# Patient Record
Sex: MALE | URBAN - NONMETROPOLITAN AREA
[De-identification: names, ages, dates, MRNs, and addresses within clinical notes are randomized per-mention and may not be internally consistent; named-entity substitution may affect disease eponyms.]

---

## 2018-12-26 ENCOUNTER — OFFICE VISIT (OUTPATIENT)
Dept: URBAN - NONMETROPOLITAN AREA CLINIC 10 | Facility: CLINIC | Age: 58
End: 2018-12-26
Payer: COMMERCIAL

## 2018-12-26 DIAGNOSIS — H25.13 AGE-RELATED NUCLEAR CATARACT, BILATERAL: ICD-10-CM

## 2018-12-26 PROCEDURE — 92004 COMPRE OPH EXAM NEW PT 1/>: CPT | Performed by: OPTOMETRIST

## 2018-12-26 RX ORDER — NEOMYCIN SULFATE, POLYMYXIN B SULFATE AND DEXAMETHASONE 3.5; 10000; 1 MG/ML; [USP'U]/ML; MG/ML
SUSPENSION OPHTHALMIC
Qty: 1 | Refills: 0 | Status: ACTIVE
Start: 2018-12-26

## 2018-12-26 ASSESSMENT — INTRAOCULAR PRESSURE
OS: 14
OD: 14

## 2018-12-26 NOTE — IMPRESSION/PLAN
Impression: Open angle with borderline findings, low risk, bilateral: H40.013. Plan: Diagnosis discussed in detail. No treatment required at this time. Continue to observe pt's optic nerve and IOP
RTC in 5 days for VF 24-2.

## 2018-12-26 NOTE — IMPRESSION/PLAN
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 Plan: Diagnosis discussed.  Start maxitrol QID OS x 1 week then d/c

## 2018-12-26 NOTE — IMPRESSION/PLAN
Impression: Age-related nuclear cataract, bilateral: H25.13. Plan: Discussed diagnosis in detail with patient. No treatment is required at this time. Will continue to observe condition and or symptoms. Call if [address] worsens.

## 2018-12-31 ENCOUNTER — OFFICE VISIT (OUTPATIENT)
Dept: URBAN - NONMETROPOLITAN AREA CLINIC 10 | Facility: CLINIC | Age: 58
End: 2018-12-31
Payer: COMMERCIAL

## 2018-12-31 DIAGNOSIS — H40.013 OPEN ANGLE WITH BORDERLINE FINDINGS, LOW RISK, BILATERAL: Primary | ICD-10-CM

## 2018-12-31 DIAGNOSIS — H11.89 OTHER SPECIFIED DISORDERS OF CONJUNCTIVA: ICD-10-CM

## 2018-12-31 PROCEDURE — 92012 INTRM OPH EXAM EST PATIENT: CPT | Performed by: OPTOMETRIST

## 2018-12-31 PROCEDURE — 92083 EXTENDED VISUAL FIELD XM: CPT | Performed by: OPTOMETRIST

## 2018-12-31 ASSESSMENT — INTRAOCULAR PRESSURE
OD: 16
OS: 18

## 2018-12-31 NOTE — IMPRESSION/PLAN
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 Plan: Dx discussed with pt. Discussed option to have consult with oculoplastics as there was no improvement with Maxitrol tx. Continue to monitor.

## 2018-12-31 NOTE — IMPRESSION/PLAN
Impression: Open angle with borderline findings, low risk, bilateral: H40.013. Plan: Diagnosis discussed in detail. No treatment required at this time.   Continue to observe pt's optic nerve and IOP

## 2025-02-18 ENCOUNTER — OFFICE VISIT (OUTPATIENT)
Dept: URBAN - METROPOLITAN AREA CLINIC 59 | Facility: CLINIC | Age: 65
End: 2025-02-18
Payer: COMMERCIAL

## 2025-02-18 DIAGNOSIS — H11.153 PINGUECULA, BILATERAL: ICD-10-CM

## 2025-02-18 DIAGNOSIS — H25.13 AGE-RELATED NUCLEAR CATARACT, BILATERAL: ICD-10-CM

## 2025-02-18 DIAGNOSIS — H40.013 OPEN ANGLE WITH BORDERLINE FINDINGS, LOW RISK, BILATERAL: Primary | ICD-10-CM

## 2025-02-18 DIAGNOSIS — H11.9 UNSPECIFIED DISORDER OF CONJUNCTIVA: ICD-10-CM

## 2025-02-18 PROCEDURE — 92004 COMPRE OPH EXAM NEW PT 1/>: CPT | Performed by: OPTOMETRIST

## 2025-02-18 PROCEDURE — 76514 ECHO EXAM OF EYE THICKNESS: CPT | Performed by: OPTOMETRIST

## 2025-02-18 PROCEDURE — 92250 FUNDUS PHOTOGRAPHY W/I&R: CPT | Performed by: OPTOMETRIST

## 2025-02-18 ASSESSMENT — INTRAOCULAR PRESSURE
OD: 16
OS: 16

## 2025-04-25 ENCOUNTER — OFFICE VISIT (OUTPATIENT)
Dept: URBAN - METROPOLITAN AREA CLINIC 59 | Facility: CLINIC | Age: 65
End: 2025-04-25
Payer: COMMERCIAL

## 2025-04-25 DIAGNOSIS — H40.1222 LOW-TENSION GLAUCOMA, LEFT EYE, MODERATE STAGE: Primary | ICD-10-CM

## 2025-04-25 DIAGNOSIS — H40.1211 LOW-TENSION GLAUCOMA, RIGHT EYE, MILD STAGE: ICD-10-CM

## 2025-04-25 PROCEDURE — 92133 CPTRZD OPH DX IMG PST SGM ON: CPT | Performed by: OPTOMETRIST

## 2025-04-25 PROCEDURE — 99214 OFFICE O/P EST MOD 30 MIN: CPT | Performed by: OPTOMETRIST

## 2025-04-25 PROCEDURE — 92083 EXTENDED VISUAL FIELD XM: CPT | Performed by: OPTOMETRIST

## 2025-04-25 RX ORDER — LATANOPROST 50 UG/ML
0.005 % SOLUTION OPHTHALMIC
Qty: 5 | Refills: 11 | Status: ACTIVE
Start: 2025-04-25

## 2025-04-25 ASSESSMENT — INTRAOCULAR PRESSURE
OD: 16
OS: 16

## 2025-05-30 ENCOUNTER — OFFICE VISIT (OUTPATIENT)
Dept: URBAN - METROPOLITAN AREA CLINIC 59 | Facility: CLINIC | Age: 65
End: 2025-05-30
Payer: COMMERCIAL

## 2025-05-30 DIAGNOSIS — H40.1211 LOW-TENSION GLAUCOMA, RIGHT EYE, MILD STAGE: ICD-10-CM

## 2025-05-30 DIAGNOSIS — H40.1222 LOW-TENSION GLAUCOMA, LEFT EYE, MODERATE STAGE: Primary | ICD-10-CM

## 2025-05-30 PROCEDURE — 99213 OFFICE O/P EST LOW 20 MIN: CPT | Performed by: OPTOMETRIST

## 2025-05-30 ASSESSMENT — INTRAOCULAR PRESSURE
OS: 13
OD: 12